# Patient Record
Sex: MALE | URBAN - METROPOLITAN AREA
[De-identification: names, ages, dates, MRNs, and addresses within clinical notes are randomized per-mention and may not be internally consistent; named-entity substitution may affect disease eponyms.]

---

## 2018-08-29 ENCOUNTER — APPOINTMENT (RX ONLY)
Dept: URBAN - METROPOLITAN AREA CLINIC 45 | Facility: CLINIC | Age: 58
Setting detail: DERMATOLOGY
End: 2018-08-29

## 2018-08-29 DIAGNOSIS — L85.3 XEROSIS CUTIS: ICD-10-CM

## 2018-08-29 DIAGNOSIS — L73.2 HIDRADENITIS SUPPURATIVA: ICD-10-CM

## 2018-08-29 PROBLEM — J45.909 UNSPECIFIED ASTHMA, UNCOMPLICATED: Status: ACTIVE | Noted: 2018-08-29

## 2018-08-29 PROBLEM — J44.9 CHRONIC OBSTRUCTIVE PULMONARY DISEASE, UNSPECIFIED: Status: ACTIVE | Noted: 2018-08-29

## 2018-08-29 PROBLEM — M12.9 ARTHROPATHY, UNSPECIFIED: Status: ACTIVE | Noted: 2018-08-29

## 2018-08-29 PROBLEM — F41.9 ANXIETY DISORDER, UNSPECIFIED: Status: ACTIVE | Noted: 2018-08-29

## 2018-08-29 PROBLEM — E13.9 OTHER SPECIFIED DIABETES MELLITUS WITHOUT COMPLICATIONS: Status: ACTIVE | Noted: 2018-08-29

## 2018-08-29 PROBLEM — E78.5 HYPERLIPIDEMIA, UNSPECIFIED: Status: ACTIVE | Noted: 2018-08-29

## 2018-08-29 PROBLEM — J30.1 ALLERGIC RHINITIS DUE TO POLLEN: Status: ACTIVE | Noted: 2018-08-29

## 2018-08-29 PROCEDURE — 99203 OFFICE O/P NEW LOW 30 MIN: CPT

## 2018-08-29 PROCEDURE — ? TREATMENT REGIMEN

## 2018-08-29 PROCEDURE — ? COUNSELING

## 2018-08-29 PROCEDURE — ? PRESCRIPTION

## 2018-08-29 RX ORDER — MINOCYCLINE HYDROCHLORIDE 105 MG/1
TABLET, FILM COATED, EXTENDED RELEASE ORAL
Qty: 30 | Refills: 4 | Status: ERX | COMMUNITY
Start: 2018-08-29

## 2018-08-29 RX ORDER — HYDROCORTISONE ACETATE, IODOQUINOL 19; 10 MG/G; MG/G
CREAM TOPICAL
Qty: 5 | Refills: 3 | Status: ERX | COMMUNITY
Start: 2018-08-29

## 2018-08-29 RX ADMIN — HYDROCORTISONE ACETATE, IODOQUINOL: 19; 10 CREAM TOPICAL at 15:36

## 2018-08-29 RX ADMIN — MINOCYCLINE HYDROCHLORIDE: 105 TABLET, FILM COATED, EXTENDED RELEASE ORAL at 15:36

## 2018-08-29 NOTE — HPI: RASH (HIDRADENITIS SUPPURATIVA)
How Severe Is It?: severe
Is This A New Presentation, Or A Follow-Up?: Rash
Additional History: Patient reports the only treatment he have done was with Dr. Merino who did surgery on his genitals to remove the boils.

## 2018-08-29 NOTE — PROCEDURE: TREATMENT REGIMEN
Detail Level: Zone
Plan: Location: Anal Region, Buttocks, Right Genital Region, Armpits\\nTreatment: Solodyn 105 mg -- take one tablet once daily as needed\\n                  Vytone cream -- apply to affected area once daily as needed\\n\\nBiologic: Will start on HUMIRA HS Starter Kit \\n\\nInitial: Inject 160mg SQ on day 1, then 80mg on day 15 (Quantity 6)\\nMaintenance: Inject 40mg SQ every week (Quantity 4)\\n\\nENROLLED IN NURSE AMBASSADOR PROGRAM\\n\\[facility]\\n\\nBSA: 13%\\n\\nAnnual blood work: CMP, CBC, HIV, Hep panel, Lipid, Quantiferon Gold\\n\\nPatient reports HS in the right genital region, buttocks and left armpit, and has had these symptoms/boils for about 15-20 years\\nPatient reports the only treatment he has done for his HS was with Dr. Merino who did surgery on his genitals to remove the “skin” that was so irritated and inflamed. Patient reports the surgery was done around April\\nPatient reports no prior use of topical ointments, biologics or oral medications it treat his HS\\nPatient was referred to Dr. Merino by his PCP Dr. Chau\\nPatient reports HS runs in his family including his sister \\n\\nPatient presents with severe inflamed pustules and boils with hyperpigmentation and scarring.\\nDiscussed with him that this is a chronic immune mediated condition where it attacks a specific type of oil, sweat glands, and follicles that live in the axillaries, mamillary, and pubic region.\\nDiscussed with patient smoking is associated with HS and encouraged patient to try to quit \\nDiscussed with patient the surgery site looks a little infected, and we need to keep it under control before we start a biologic injection\\nPatient reports he has a surgery on September 13th, and is concerned about starting a biologic right now\\nDiscussed with patient we can start Humira injections after his surgery\\nPatient expressed understanding and agreed with plan\\n\\nWill prescribe oral antibiotic Solodyn 105 mg for patient to take once daily to reduce inflammation and fight infection until patient has his surgery\\nDiscussed with patient he is allowed to stop oral antibiotic if his surgeon or another provider tells him to  \\nWill prescribe Vytone for patient to apply on infected area on right inner thigh once daily as needed\\nToday, will do a culture of the infected area on right inner thigh to send to lab\\nAdvised patient to discontinue putting Gold Bond on infected area of right inner thigh\\n\\nHis HS is very inflamed and painful, the best recommendation is to start him on a systemic treatment like Humira\\nWe need to immediately get him started on Humira because it is very out of control and affecting his quality of life\\nHumira (TNF alpha inhibitor)---Will help reduce inflammation but also lowers your immune system. Discussed Humira Ambassador program that assist patients on treatment\\nLab slip given to patient.\\n\\nF/u in 3-4 weeks

## 2018-09-27 ENCOUNTER — RX ONLY (OUTPATIENT)
Age: 58
Setting detail: RX ONLY
End: 2018-09-27

## 2018-09-27 RX ORDER — ADALIMUMAB 40MG/0.8ML
KIT SUBCUTANEOUS
Qty: 2 | Refills: 12 | Status: ERX | COMMUNITY
Start: 2018-09-27

## 2018-09-27 RX ORDER — ADALIMUMAB 80MG/0.8ML
KIT SUBCUTANEOUS
Qty: 1 | Refills: 0 | Status: ERX | COMMUNITY
Start: 2018-09-27